# Patient Record
Sex: FEMALE | Race: WHITE | Employment: OTHER | URBAN - METROPOLITAN AREA
[De-identification: names, ages, dates, MRNs, and addresses within clinical notes are randomized per-mention and may not be internally consistent; named-entity substitution may affect disease eponyms.]

---

## 2018-06-26 ENCOUNTER — ANESTHESIA EVENT (OUTPATIENT)
Dept: OPERATING ROOM | Age: 63
End: 2018-06-26
Payer: MEDICARE

## 2018-06-27 ENCOUNTER — HOSPITAL ENCOUNTER (OUTPATIENT)
Age: 63
Setting detail: OUTPATIENT SURGERY
Discharge: HOME | End: 2018-06-27
Attending: INTERNAL MEDICINE | Admitting: INTERNAL MEDICINE
Payer: MEDICARE

## 2018-06-27 ENCOUNTER — ANESTHESIA (OUTPATIENT)
Dept: OPERATING ROOM | Age: 63
End: 2018-06-27
Payer: MEDICARE

## 2018-06-27 ENCOUNTER — APPOINTMENT (OUTPATIENT)
Dept: GENERAL RADIOLOGY | Age: 63
End: 2018-06-27
Attending: INTERNAL MEDICINE
Payer: MEDICARE

## 2018-06-27 VITALS
SYSTOLIC BLOOD PRESSURE: 136 MMHG | TEMPERATURE: 97.2 F | BODY MASS INDEX: 27 KG/M2 | RESPIRATION RATE: 16 BRPM | HEART RATE: 60 BPM | HEIGHT: 66 IN | WEIGHT: 168 LBS | DIASTOLIC BLOOD PRESSURE: 95 MMHG | OXYGEN SATURATION: 97 %

## 2018-06-27 VITALS — OXYGEN SATURATION: 100 % | DIASTOLIC BLOOD PRESSURE: 95 MMHG | TEMPERATURE: 96.1 F | SYSTOLIC BLOOD PRESSURE: 136 MMHG

## 2018-06-27 LAB
ALBUMIN SERPL-MCNC: 4.1 G/DL (ref 3.5–5.2)
ALBUMIN/GLOBULIN RATIO: 1.6 (ref 1–2.5)
ALP BLD-CCNC: 45 U/L (ref 35–104)
ALT SERPL-CCNC: 9 U/L (ref 5–33)
AST SERPL-CCNC: 15 U/L
BILIRUB SERPL-MCNC: 0.36 MG/DL (ref 0.3–1.2)
BILIRUBIN DIRECT: 0.09 MG/DL
BILIRUBIN, INDIRECT: 0.27 MG/DL (ref 0–1)
GLOBULIN: NORMAL G/DL (ref 1.5–3.8)
TOTAL PROTEIN: 6.6 G/DL (ref 6.4–8.3)

## 2018-06-27 PROCEDURE — 3609012400 HC EGD TRANSORAL BIOPSY SINGLE/MULTIPLE: Performed by: INTERNAL MEDICINE

## 2018-06-27 PROCEDURE — 2580000003 HC RX 258: Performed by: NURSE ANESTHETIST, CERTIFIED REGISTERED

## 2018-06-27 PROCEDURE — 74328 X-RAY BILE DUCT ENDOSCOPY: CPT

## 2018-06-27 PROCEDURE — 2580000003 HC RX 258: Performed by: ANESTHESIOLOGY

## 2018-06-27 PROCEDURE — 2720000010 HC SURG SUPPLY STERILE: Performed by: INTERNAL MEDICINE

## 2018-06-27 PROCEDURE — 6370000000 HC RX 637 (ALT 250 FOR IP): Performed by: ANESTHESIOLOGY

## 2018-06-27 PROCEDURE — 7100000001 HC PACU RECOVERY - ADDTL 15 MIN: Performed by: INTERNAL MEDICINE

## 2018-06-27 PROCEDURE — 3609018800 HC ERCP DX COLLECTION SPECIMEN BRUSHING/WASHING: Performed by: INTERNAL MEDICINE

## 2018-06-27 PROCEDURE — 7100000010 HC PHASE II RECOVERY - FIRST 15 MIN: Performed by: INTERNAL MEDICINE

## 2018-06-27 PROCEDURE — 7100000000 HC PACU RECOVERY - FIRST 15 MIN: Performed by: INTERNAL MEDICINE

## 2018-06-27 PROCEDURE — 2500000003 HC RX 250 WO HCPCS: Performed by: NURSE ANESTHETIST, CERTIFIED REGISTERED

## 2018-06-27 PROCEDURE — 88305 TISSUE EXAM BY PATHOLOGIST: CPT

## 2018-06-27 PROCEDURE — 3700000000 HC ANESTHESIA ATTENDED CARE: Performed by: INTERNAL MEDICINE

## 2018-06-27 PROCEDURE — 6360000002 HC RX W HCPCS: Performed by: ANESTHESIOLOGY

## 2018-06-27 PROCEDURE — C1726 CATH, BAL DIL, NON-VASCULAR: HCPCS | Performed by: INTERNAL MEDICINE

## 2018-06-27 PROCEDURE — C1769 GUIDE WIRE: HCPCS | Performed by: INTERNAL MEDICINE

## 2018-06-27 PROCEDURE — 3700000001 HC ADD 15 MINUTES (ANESTHESIA): Performed by: INTERNAL MEDICINE

## 2018-06-27 PROCEDURE — C1725 CATH, TRANSLUMIN NON-LASER: HCPCS | Performed by: INTERNAL MEDICINE

## 2018-06-27 PROCEDURE — 6360000002 HC RX W HCPCS: Performed by: NURSE ANESTHETIST, CERTIFIED REGISTERED

## 2018-06-27 PROCEDURE — 80076 HEPATIC FUNCTION PANEL: CPT

## 2018-06-27 PROCEDURE — 3609018700 HC ENDOSCOPIC ULTRASOUND: Performed by: INTERNAL MEDICINE

## 2018-06-27 RX ORDER — SODIUM CHLORIDE, SODIUM LACTATE, POTASSIUM CHLORIDE, CALCIUM CHLORIDE 600; 310; 30; 20 MG/100ML; MG/100ML; MG/100ML; MG/100ML
INJECTION, SOLUTION INTRAVENOUS CONTINUOUS PRN
Status: DISCONTINUED | OUTPATIENT
Start: 2018-06-27 | End: 2018-06-27 | Stop reason: SDUPTHER

## 2018-06-27 RX ORDER — FENTANYL CITRATE 50 UG/ML
INJECTION, SOLUTION INTRAMUSCULAR; INTRAVENOUS PRN
Status: DISCONTINUED | OUTPATIENT
Start: 2018-06-27 | End: 2018-06-27 | Stop reason: SDUPTHER

## 2018-06-27 RX ORDER — LIDOCAINE HYDROCHLORIDE 10 MG/ML
INJECTION, SOLUTION EPIDURAL; INFILTRATION; INTRACAUDAL; PERINEURAL PRN
Status: DISCONTINUED | OUTPATIENT
Start: 2018-06-27 | End: 2018-06-27 | Stop reason: SDUPTHER

## 2018-06-27 RX ORDER — SODIUM CHLORIDE, SODIUM LACTATE, POTASSIUM CHLORIDE, CALCIUM CHLORIDE 600; 310; 30; 20 MG/100ML; MG/100ML; MG/100ML; MG/100ML
INJECTION, SOLUTION INTRAVENOUS CONTINUOUS
Status: DISCONTINUED | OUTPATIENT
Start: 2018-06-27 | End: 2018-06-27 | Stop reason: HOSPADM

## 2018-06-27 RX ORDER — MIDAZOLAM HYDROCHLORIDE 1 MG/ML
1 INJECTION INTRAMUSCULAR; INTRAVENOUS EVERY 5 MIN PRN
Status: COMPLETED | OUTPATIENT
Start: 2018-06-27 | End: 2018-06-27

## 2018-06-27 RX ORDER — FENTANYL CITRATE 50 UG/ML
25 INJECTION, SOLUTION INTRAMUSCULAR; INTRAVENOUS EVERY 5 MIN PRN
Status: DISCONTINUED | OUTPATIENT
Start: 2018-06-27 | End: 2018-06-27 | Stop reason: HOSPADM

## 2018-06-27 RX ORDER — CEFAZOLIN SODIUM 1 G/3ML
INJECTION, POWDER, FOR SOLUTION INTRAMUSCULAR; INTRAVENOUS PRN
Status: DISCONTINUED | OUTPATIENT
Start: 2018-06-27 | End: 2018-06-27 | Stop reason: SDUPTHER

## 2018-06-27 RX ORDER — DIPHENHYDRAMINE HYDROCHLORIDE 50 MG/ML
12.5 INJECTION INTRAMUSCULAR; INTRAVENOUS
Status: DISCONTINUED | OUTPATIENT
Start: 2018-06-27 | End: 2018-06-27 | Stop reason: HOSPADM

## 2018-06-27 RX ORDER — SCOLOPAMINE TRANSDERMAL SYSTEM 1 MG/1
1 PATCH, EXTENDED RELEASE TRANSDERMAL ONCE
Status: DISCONTINUED | OUTPATIENT
Start: 2018-06-27 | End: 2018-06-27 | Stop reason: HOSPADM

## 2018-06-27 RX ORDER — PROPOFOL 10 MG/ML
INJECTION, EMULSION INTRAVENOUS PRN
Status: DISCONTINUED | OUTPATIENT
Start: 2018-06-27 | End: 2018-06-27 | Stop reason: SDUPTHER

## 2018-06-27 RX ORDER — ROCURONIUM BROMIDE 10 MG/ML
INJECTION, SOLUTION INTRAVENOUS PRN
Status: DISCONTINUED | OUTPATIENT
Start: 2018-06-27 | End: 2018-06-27 | Stop reason: SDUPTHER

## 2018-06-27 RX ORDER — GLYCOPYRROLATE 1 MG/5 ML
SYRINGE (ML) INTRAVENOUS PRN
Status: DISCONTINUED | OUTPATIENT
Start: 2018-06-27 | End: 2018-06-27 | Stop reason: SDUPTHER

## 2018-06-27 RX ORDER — ONDANSETRON 2 MG/ML
4 INJECTION INTRAMUSCULAR; INTRAVENOUS
Status: DISCONTINUED | OUTPATIENT
Start: 2018-06-27 | End: 2018-06-27 | Stop reason: HOSPADM

## 2018-06-27 RX ORDER — FENTANYL CITRATE 50 UG/ML
50 INJECTION, SOLUTION INTRAMUSCULAR; INTRAVENOUS EVERY 5 MIN PRN
Status: DISCONTINUED | OUTPATIENT
Start: 2018-06-27 | End: 2018-06-27 | Stop reason: HOSPADM

## 2018-06-27 RX ADMIN — PROPOFOL 200 MG: 10 INJECTION, EMULSION INTRAVENOUS at 13:29

## 2018-06-27 RX ADMIN — CEFAZOLIN 2000 MG: 1 INJECTION, POWDER, FOR SOLUTION INTRAMUSCULAR; INTRAVENOUS at 14:10

## 2018-06-27 RX ADMIN — SODIUM CHLORIDE, POTASSIUM CHLORIDE, SODIUM LACTATE AND CALCIUM CHLORIDE: 600; 310; 30; 20 INJECTION, SOLUTION INTRAVENOUS at 15:00

## 2018-06-27 RX ADMIN — LIDOCAINE HYDROCHLORIDE 20 MG: 10 INJECTION, SOLUTION EPIDURAL; INFILTRATION; INTRACAUDAL; PERINEURAL at 14:18

## 2018-06-27 RX ADMIN — HYDROCORTISONE SODIUM SUCCINATE 100 MG: 100 INJECTION, POWDER, FOR SOLUTION INTRAMUSCULAR; INTRAVENOUS at 13:27

## 2018-06-27 RX ADMIN — NEOSTIGMINE METHYLSULFATE 3 MG: 1 INJECTION, SOLUTION INTRAMUSCULAR; INTRAVENOUS; SUBCUTANEOUS at 15:15

## 2018-06-27 RX ADMIN — ROCURONIUM BROMIDE 50 MG: 10 INJECTION INTRAVENOUS at 13:29

## 2018-06-27 RX ADMIN — PHENYLEPHRINE HYDROCHLORIDE 100 MCG: 10 INJECTION INTRAVENOUS at 14:45

## 2018-06-27 RX ADMIN — FENTANYL CITRATE 50 MCG: 50 INJECTION INTRAMUSCULAR; INTRAVENOUS at 14:55

## 2018-06-27 RX ADMIN — PHENYLEPHRINE HYDROCHLORIDE 100 MCG: 10 INJECTION INTRAVENOUS at 14:05

## 2018-06-27 RX ADMIN — Medication 0.4 MG: at 15:15

## 2018-06-27 RX ADMIN — MIDAZOLAM HYDROCHLORIDE 1 MG: 1 INJECTION, SOLUTION INTRAMUSCULAR; INTRAVENOUS at 13:01

## 2018-06-27 RX ADMIN — SODIUM CHLORIDE, POTASSIUM CHLORIDE, SODIUM LACTATE AND CALCIUM CHLORIDE: 600; 310; 30; 20 INJECTION, SOLUTION INTRAVENOUS at 13:24

## 2018-06-27 RX ADMIN — LIDOCAINE HYDROCHLORIDE 50 MG: 10 INJECTION, SOLUTION EPIDURAL; INFILTRATION; INTRACAUDAL; PERINEURAL at 13:29

## 2018-06-27 RX ADMIN — Medication 0.2 MG: at 14:05

## 2018-06-27 RX ADMIN — SODIUM CHLORIDE, POTASSIUM CHLORIDE, SODIUM LACTATE AND CALCIUM CHLORIDE: 600; 310; 30; 20 INJECTION, SOLUTION INTRAVENOUS at 12:22

## 2018-06-27 RX ADMIN — MIDAZOLAM HYDROCHLORIDE 1 MG: 1 INJECTION, SOLUTION INTRAMUSCULAR; INTRAVENOUS at 13:02

## 2018-06-27 RX ADMIN — FENTANYL CITRATE 50 MCG: 50 INJECTION INTRAMUSCULAR; INTRAVENOUS at 13:29

## 2018-06-27 RX ADMIN — PHENYLEPHRINE HYDROCHLORIDE 100 MCG: 10 INJECTION INTRAVENOUS at 14:26

## 2018-06-27 ASSESSMENT — PULMONARY FUNCTION TESTS
PIF_VALUE: 26
PIF_VALUE: 3
PIF_VALUE: 26
PIF_VALUE: 28
PIF_VALUE: 18
PIF_VALUE: 30
PIF_VALUE: 29
PIF_VALUE: 2
PIF_VALUE: 18
PIF_VALUE: 17
PIF_VALUE: 18
PIF_VALUE: 19
PIF_VALUE: 32
PIF_VALUE: 19
PIF_VALUE: 18
PIF_VALUE: 19
PIF_VALUE: 8
PIF_VALUE: 32
PIF_VALUE: 18
PIF_VALUE: 27
PIF_VALUE: 19
PIF_VALUE: 29
PIF_VALUE: 18
PIF_VALUE: 20
PIF_VALUE: 1
PIF_VALUE: 19
PIF_VALUE: 26
PIF_VALUE: 19
PIF_VALUE: 29
PIF_VALUE: 19
PIF_VALUE: 18
PIF_VALUE: 18
PIF_VALUE: 32
PIF_VALUE: 18
PIF_VALUE: 32
PIF_VALUE: 0
PIF_VALUE: 0
PIF_VALUE: 23
PIF_VALUE: 19
PIF_VALUE: 31
PIF_VALUE: 29
PIF_VALUE: 23
PIF_VALUE: 3
PIF_VALUE: 28
PIF_VALUE: 2
PIF_VALUE: 19
PIF_VALUE: 32
PIF_VALUE: 19
PIF_VALUE: 18
PIF_VALUE: 27
PIF_VALUE: 29
PIF_VALUE: 27
PIF_VALUE: 16
PIF_VALUE: 11
PIF_VALUE: 32
PIF_VALUE: 18
PIF_VALUE: 22
PIF_VALUE: 28
PIF_VALUE: 31
PIF_VALUE: 28
PIF_VALUE: 19
PIF_VALUE: 2
PIF_VALUE: 6
PIF_VALUE: 3
PIF_VALUE: 31
PIF_VALUE: 29
PIF_VALUE: 13
PIF_VALUE: 29
PIF_VALUE: 18
PIF_VALUE: 19
PIF_VALUE: 32
PIF_VALUE: 21
PIF_VALUE: 18
PIF_VALUE: 26
PIF_VALUE: 27
PIF_VALUE: 30
PIF_VALUE: 28
PIF_VALUE: 16
PIF_VALUE: 23
PIF_VALUE: 19
PIF_VALUE: 23
PIF_VALUE: 34
PIF_VALUE: 19
PIF_VALUE: 30
PIF_VALUE: 28
PIF_VALUE: 20
PIF_VALUE: 18
PIF_VALUE: 19
PIF_VALUE: 28
PIF_VALUE: 19
PIF_VALUE: 23
PIF_VALUE: 17
PIF_VALUE: 21
PIF_VALUE: 18
PIF_VALUE: 28
PIF_VALUE: 1
PIF_VALUE: 28
PIF_VALUE: 18
PIF_VALUE: 18
PIF_VALUE: 22
PIF_VALUE: 19
PIF_VALUE: 18
PIF_VALUE: 35
PIF_VALUE: 19
PIF_VALUE: 19
PIF_VALUE: 18
PIF_VALUE: 25
PIF_VALUE: 17
PIF_VALUE: 17
PIF_VALUE: 19
PIF_VALUE: 18
PIF_VALUE: 30
PIF_VALUE: 0
PIF_VALUE: 22
PIF_VALUE: 19
PIF_VALUE: 28
PIF_VALUE: 27
PIF_VALUE: 2
PIF_VALUE: 18
PIF_VALUE: 18
PIF_VALUE: 27
PIF_VALUE: 27

## 2018-06-27 ASSESSMENT — ENCOUNTER SYMPTOMS
SHORTNESS OF BREATH: 0
STRIDOR: 0

## 2018-06-27 ASSESSMENT — PAIN SCALES - GENERAL
PAINLEVEL_OUTOF10: 0

## 2018-06-27 ASSESSMENT — PAIN - FUNCTIONAL ASSESSMENT: PAIN_FUNCTIONAL_ASSESSMENT: 0-10

## 2018-06-28 LAB — SURGICAL PATHOLOGY REPORT: NORMAL

## 2019-08-14 ENCOUNTER — HOSPITAL ENCOUNTER (OUTPATIENT)
Age: 64
Setting detail: SPECIMEN
Discharge: HOME | End: 2019-08-14
Payer: COMMERCIAL

## 2019-08-23 LAB
SEND OUT REPORT: NORMAL
TEST NAME: NORMAL

## 2023-09-11 RX ORDER — TRAZODONE HYDROCHLORIDE 50 MG/1
TABLET ORAL
Qty: 30 TABLET | Refills: 5 | Status: SHIPPED | OUTPATIENT
Start: 2023-09-11

## 2023-09-11 NOTE — TELEPHONE ENCOUNTER
Nahum Wynne is calling to request a refill on the following medication(s):    Medication Request:  Requested Prescriptions     Pending Prescriptions Disp Refills    traZODone (DESYREL) 50 MG tablet [Pharmacy Med Name: traZODone 50 MG TABLET] 30 tablet      Sig: TAKE ONE TABLET BY MOUTH EVERY NIGHT AT BEDTIME AS NEEDED       Last Visit Date (If Applicable):  Visit date not found    Next Visit Date:    9/12/2023

## 2023-09-12 ENCOUNTER — OFFICE VISIT (OUTPATIENT)
Age: 68
End: 2023-09-12
Payer: MEDICARE

## 2023-09-12 VITALS
RESPIRATION RATE: 16 BRPM | SYSTOLIC BLOOD PRESSURE: 130 MMHG | BODY MASS INDEX: 22.95 KG/M2 | TEMPERATURE: 97.2 F | HEART RATE: 66 BPM | DIASTOLIC BLOOD PRESSURE: 86 MMHG | WEIGHT: 142.2 LBS

## 2023-09-12 DIAGNOSIS — G40.109 EPILEPSY, PARTIAL (HCC): ICD-10-CM

## 2023-09-12 DIAGNOSIS — M19.042 PRIMARY OSTEOARTHRITIS OF BOTH HANDS: Primary | ICD-10-CM

## 2023-09-12 DIAGNOSIS — Z91.81 AT HIGH RISK FOR FALLS: ICD-10-CM

## 2023-09-12 DIAGNOSIS — M19.041 PRIMARY OSTEOARTHRITIS OF BOTH HANDS: Primary | ICD-10-CM

## 2023-09-12 PROCEDURE — 3017F COLORECTAL CA SCREEN DOC REV: CPT | Performed by: FAMILY MEDICINE

## 2023-09-12 PROCEDURE — G8400 PT W/DXA NO RESULTS DOC: HCPCS | Performed by: FAMILY MEDICINE

## 2023-09-12 PROCEDURE — G8427 DOCREV CUR MEDS BY ELIG CLIN: HCPCS | Performed by: FAMILY MEDICINE

## 2023-09-12 PROCEDURE — 1036F TOBACCO NON-USER: CPT | Performed by: FAMILY MEDICINE

## 2023-09-12 PROCEDURE — 1123F ACP DISCUSS/DSCN MKR DOCD: CPT | Performed by: FAMILY MEDICINE

## 2023-09-12 PROCEDURE — G8420 CALC BMI NORM PARAMETERS: HCPCS | Performed by: FAMILY MEDICINE

## 2023-09-12 PROCEDURE — 1090F PRES/ABSN URINE INCON ASSESS: CPT | Performed by: FAMILY MEDICINE

## 2023-09-12 PROCEDURE — 99213 OFFICE O/P EST LOW 20 MIN: CPT | Performed by: FAMILY MEDICINE

## 2023-09-12 RX ORDER — MULTIVITAMIN WITH IRON
250 TABLET ORAL DAILY
COMMUNITY

## 2023-09-12 RX ORDER — QUETIAPINE FUMARATE 25 MG/1
25 TABLET, FILM COATED ORAL NIGHTLY
COMMUNITY

## 2023-09-12 RX ORDER — DOXYCYCLINE HYCLATE 100 MG
100 TABLET ORAL 2 TIMES DAILY
COMMUNITY

## 2023-09-12 RX ORDER — CHOLESTYRAMINE 4 G/9G
1 POWDER, FOR SUSPENSION ORAL
COMMUNITY

## 2023-09-12 RX ORDER — BUPROPION HYDROCHLORIDE 150 MG/1
150 TABLET ORAL EVERY MORNING
COMMUNITY

## 2023-09-12 RX ORDER — DULOXETIN HYDROCHLORIDE 30 MG/1
30 CAPSULE, DELAYED RELEASE ORAL DAILY
COMMUNITY

## 2023-09-12 RX ORDER — PREDNISONE 20 MG/1
20 TABLET ORAL 2 TIMES DAILY
Qty: 10 TABLET | Refills: 0 | Status: SHIPPED | OUTPATIENT
Start: 2023-09-12 | End: 2023-09-15 | Stop reason: SDUPTHER

## 2023-09-12 RX ORDER — CYCLOBENZAPRINE HCL 10 MG
10 TABLET ORAL 3 TIMES DAILY PRN
COMMUNITY

## 2023-09-12 RX ORDER — TEMAZEPAM 30 MG/1
1 CAPSULE ORAL NIGHTLY
COMMUNITY
Start: 2023-08-23

## 2023-09-12 RX ORDER — OXYCODONE HYDROCHLORIDE AND ACETAMINOPHEN 5; 325 MG/1; MG/1
1 TABLET ORAL EVERY 4 HOURS PRN
COMMUNITY

## 2023-09-12 RX ORDER — CEPHALEXIN 500 MG/1
500 CAPSULE ORAL 3 TIMES DAILY
COMMUNITY

## 2023-09-12 RX ORDER — PANTOPRAZOLE SODIUM 40 MG/1
1 TABLET, DELAYED RELEASE ORAL DAILY
COMMUNITY
Start: 2023-07-13

## 2023-09-12 RX ORDER — HYDROXYZINE HYDROCHLORIDE 25 MG/1
25 TABLET, FILM COATED ORAL 3 TIMES DAILY PRN
COMMUNITY

## 2023-09-12 RX ORDER — ALPRAZOLAM 0.5 MG/1
0.5 TABLET ORAL NIGHTLY PRN
COMMUNITY

## 2023-09-12 RX ORDER — FUROSEMIDE 20 MG/1
20 TABLET ORAL 2 TIMES DAILY PRN
COMMUNITY

## 2023-09-12 RX ORDER — IBUPROFEN 800 MG/1
800 TABLET ORAL EVERY 8 HOURS PRN
COMMUNITY

## 2023-09-12 RX ORDER — ATORVASTATIN CALCIUM 10 MG/1
10 TABLET, FILM COATED ORAL DAILY
COMMUNITY

## 2023-09-12 RX ORDER — DICYCLOMINE HYDROCHLORIDE 10 MG/1
10 CAPSULE ORAL
COMMUNITY

## 2023-09-12 SDOH — ECONOMIC STABILITY: FOOD INSECURITY: WITHIN THE PAST 12 MONTHS, YOU WORRIED THAT YOUR FOOD WOULD RUN OUT BEFORE YOU GOT MONEY TO BUY MORE.: NEVER TRUE

## 2023-09-12 SDOH — ECONOMIC STABILITY: HOUSING INSECURITY
IN THE LAST 12 MONTHS, WAS THERE A TIME WHEN YOU DID NOT HAVE A STEADY PLACE TO SLEEP OR SLEPT IN A SHELTER (INCLUDING NOW)?: NO

## 2023-09-12 SDOH — ECONOMIC STABILITY: INCOME INSECURITY: HOW HARD IS IT FOR YOU TO PAY FOR THE VERY BASICS LIKE FOOD, HOUSING, MEDICAL CARE, AND HEATING?: NOT HARD AT ALL

## 2023-09-12 SDOH — ECONOMIC STABILITY: FOOD INSECURITY: WITHIN THE PAST 12 MONTHS, THE FOOD YOU BOUGHT JUST DIDN'T LAST AND YOU DIDN'T HAVE MONEY TO GET MORE.: NEVER TRUE

## 2023-09-12 ASSESSMENT — PATIENT HEALTH QUESTIONNAIRE - PHQ9
4. FEELING TIRED OR HAVING LITTLE ENERGY: 2
8. MOVING OR SPEAKING SO SLOWLY THAT OTHER PEOPLE COULD HAVE NOTICED. OR THE OPPOSITE, BEING SO FIGETY OR RESTLESS THAT YOU HAVE BEEN MOVING AROUND A LOT MORE THAN USUAL: 1
SUM OF ALL RESPONSES TO PHQ9 QUESTIONS 1 & 2: 3
6. FEELING BAD ABOUT YOURSELF - OR THAT YOU ARE A FAILURE OR HAVE LET YOURSELF OR YOUR FAMILY DOWN: 3
SUM OF ALL RESPONSES TO PHQ QUESTIONS 1-9: 15
10. IF YOU CHECKED OFF ANY PROBLEMS, HOW DIFFICULT HAVE THESE PROBLEMS MADE IT FOR YOU TO DO YOUR WORK, TAKE CARE OF THINGS AT HOME, OR GET ALONG WITH OTHER PEOPLE: 2
7. TROUBLE CONCENTRATING ON THINGS, SUCH AS READING THE NEWSPAPER OR WATCHING TELEVISION: 2
9. THOUGHTS THAT YOU WOULD BE BETTER OFF DEAD, OR OF HURTING YOURSELF: 0
2. FEELING DOWN, DEPRESSED OR HOPELESS: 3
5. POOR APPETITE OR OVEREATING: 2
1. LITTLE INTEREST OR PLEASURE IN DOING THINGS: 0
3. TROUBLE FALLING OR STAYING ASLEEP: 2

## 2023-09-12 NOTE — PROGRESS NOTES
Take by mouth      buPROPion (WELLBUTRIN XL) 150 MG extended release tablet Take 1 tablet by mouth every morning      traZODone (DESYREL) 50 MG tablet TAKE ONE TABLET BY MOUTH EVERY NIGHT AT BEDTIME AS NEEDED 30 tablet 5    NONFORMULARY Take 60 mg by mouth daily (Patient not taking: Reported on 9/12/2023)      divalproex (DEPAKOTE) 250 MG DR tablet TAKE 1 TABLET FOUR TIMES A  tablet 3    MAXALT 10 MG tablet 1 at onset of headache. May repeat in 2 h x 2 if headache not gone. Must be RED 18 tablet 3    rizatriptan (MAXALT-MLT) 10 MG disintegrating tablet PLACE 1 TABLET ON TONGUE AT ONSET OF HEADACHE. MAY REPEAT IN 2 HOURS IF HEADACHE NOT RELIEVED 18 tablet 1    butalbital-acetaminophen-caffeine (FIORICET, ESGIC) per tablet TAKE ONE TABLET BY MOUTH EVERY 6 HOURS AS NEEDED FOR PAIN 60 tablet 0    simvastatin (ZOCOR) 10 MG tablet Take 10 mg by mouth nightly. (Patient not taking: Reported on 9/12/2023)      citalopram (CELEXA) 40 MG tablet Take 40 mg by mouth daily. (Patient not taking: Reported on 9/12/2023)       No current facility-administered medications for this visit. Patient Active Problem List   Diagnosis    Epilepsy, partial ([address])    Ataxic gait    Migraine without aura       Past Medical History:   Diagnosis Date    Ataxic gait     Cervical disc displacement     MRI cervical spine 5/2009 with right paracentral protrusion and osteophyte spur at C6-7. Operated by Dr. Kenneth Pedersen 6/2009. Crohn's disease ([address])     Epilepsy, partial ([address])     Gastroesophageal reflux disease     Kidney stone     Melanoma ([address])     Memory disorder     Neuropsychological testing never done.     Migraine     Mitral valve prolapse     Osteoarthritis     Ulcerative colitis [facility])        Past Surgical History:   Procedure Laterality Date    CERVICAL SPINE SURGERY  2009    C6-7 fusion    [facility] ENDOSCOPIC US S&I N/A 6/27/2018    ENDOSCOPIC ULTRASOUND - GI UNIT SCHEDLED, PATHOLOGY NOTIFIED (PAT N) performed by Riki RASHID

## 2023-09-13 ENCOUNTER — TELEPHONE (OUTPATIENT)
Age: 68
End: 2023-09-13

## 2023-09-13 ASSESSMENT — ENCOUNTER SYMPTOMS
CHEST TIGHTNESS: 0
SHORTNESS OF BREATH: 0

## 2023-09-13 NOTE — TELEPHONE ENCOUNTER
Poppy Chandler () states patient was here yesterday and her Voltaren and Prednisone got sent to the wrong pharmacy. Please send to [facility].  No need to advise when done unless there is a problem

## 2023-09-14 ENCOUNTER — TELEPHONE (OUTPATIENT)
Age: 68
End: 2023-09-14

## 2023-09-14 NOTE — TELEPHONE ENCOUNTER
Patient  stopped by office to update patients pharmacy, they now use [facility].  Needs changed and also would like RX's sent on 9/12 the diclofencac and Prednisone sent to [facility] instead

## 2023-09-15 ENCOUNTER — TELEPHONE (OUTPATIENT)
Age: 68
End: 2023-09-15

## 2023-09-15 RX ORDER — PREDNISONE 20 MG/1
20 TABLET ORAL 2 TIMES DAILY
Qty: 10 TABLET | Refills: 0 | Status: SHIPPED | OUTPATIENT
Start: 2023-09-15 | End: 2023-09-20 | Stop reason: SDUPTHER

## 2023-09-15 NOTE — TELEPHONE ENCOUNTER
We sent prednisone and voltaren cream rx to wrong pharmacy.  Please send to [facility] and let pt know

## 2023-09-20 RX ORDER — PREDNISONE 20 MG/1
20 TABLET ORAL 2 TIMES DAILY
Qty: 10 TABLET | Refills: 0 | Status: SHIPPED | OUTPATIENT
Start: 2023-09-20 | End: 2023-09-25

## 2023-09-20 NOTE — TELEPHONE ENCOUNTER
Nevin Pena is calling to request a refill on the following medication(s):    Medication Request:  Requested Prescriptions     Pending Prescriptions Disp Refills    predniSONE (DELTASONE) 20 MG tablet 10 tablet 0     Sig: Take 1 tablet by mouth 2 times daily for 5 days    diclofenac sodium (VOLTAREN) 1 %  g 2     Sig: Apply 2 g topically 4 times daily       Last Visit Date (If Applicable):  5/39/4852    Next Visit Date:    10/10/2023

## 2023-09-26 ENCOUNTER — TELEPHONE (OUTPATIENT)
Age: 68
End: 2023-09-26

## 2023-09-26 NOTE — TELEPHONE ENCOUNTER
Handicap koko made, signed by Dr. Estrella Emery. Patient advised her and  Roxy Bedolla notes ready at desk.

## 2023-10-06 RX ORDER — DIPHENOXYLATE HYDROCHLORIDE AND ATROPINE SULFATE 2.5; .025 MG/1; MG/1
1 TABLET ORAL 4 TIMES DAILY PRN
COMMUNITY

## 2023-10-06 RX ORDER — DIVALPROEX SODIUM 250 MG/1
250 TABLET, DELAYED RELEASE ORAL 4 TIMES DAILY
COMMUNITY

## 2023-10-09 DIAGNOSIS — I34.1 MITRAL VALVE PROLAPSE: ICD-10-CM

## 2023-10-09 DIAGNOSIS — N20.0 KIDNEY STONE: Primary | ICD-10-CM

## 2023-10-09 PROBLEM — K25.9 STOMACH ULCER: Status: ACTIVE | Noted: 2023-10-09

## 2023-10-09 PROBLEM — F32.9 MAJOR DEPRESSION, SINGLE EPISODE: Status: ACTIVE | Noted: 2019-01-11

## 2023-10-09 PROBLEM — R22.30 MASS OF SHOULDER REGION: Status: ACTIVE | Noted: 2019-01-11

## 2023-10-09 PROBLEM — S20.219A CONTUSION OF RIB: Status: ACTIVE | Noted: 2019-01-11

## 2023-10-09 PROBLEM — G20.C PRIMARY PARKINSONISM: Status: ACTIVE | Noted: 2019-01-11

## 2023-10-09 PROBLEM — I73.00 RAYNAUD'S DISEASE: Status: ACTIVE | Noted: 2023-10-09

## 2023-10-09 PROBLEM — R53.83 FATIGUE: Status: ACTIVE | Noted: 2019-01-11

## 2023-10-09 PROBLEM — R60.9 PERIPHERAL EDEMA: Status: ACTIVE | Noted: 2019-01-11

## 2023-10-09 PROBLEM — M71.9 BURSITIS: Status: ACTIVE | Noted: 2019-01-11

## 2023-10-09 PROBLEM — H04.129 TEAR FILM INSUFFICIENCY: Status: ACTIVE | Noted: 2019-01-11
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
## 2023-10-09 PROBLEM — H93.19 TINNITUS: Status: ACTIVE | Noted: 2023-10-09

## 2023-10-09 PROBLEM — H40.9 GLAUCOMA: Status: ACTIVE | Noted: 2019-01-11

## 2023-10-09 PROBLEM — R20.9 SKIN SENSATION DISTURBANCE: Status: ACTIVE | Noted: 2019-01-11

## 2023-10-09 PROBLEM — N39.0 URINARY TRACT INFECTION: Status: ACTIVE | Noted: 2023-10-09

## 2023-10-09 PROBLEM — R29.6 FREQUENT FALLS: Status: ACTIVE | Noted: 2019-01-11

## 2023-10-09 PROBLEM — R31.29 MICROSCOPIC HEMATURIA: Status: ACTIVE | Noted: 2019-01-11

## 2023-10-09 PROBLEM — R27.0 ATAXIA: Status: ACTIVE | Noted: 2019-01-11

## 2023-10-09 PROBLEM — M25.50 PAIN IN JOINT: Status: ACTIVE | Noted: 2019-01-11

## 2023-10-09 PROBLEM — R41.3 MEMORY LOSS: Status: ACTIVE | Noted: 2023-04-19

## 2023-10-09 PROBLEM — K52.9 COLITIS: Status: ACTIVE | Noted: 2023-10-09

## 2023-10-09 PROBLEM — R79.9 ABNORMAL BLOOD CHEMISTRY LEVEL: Status: ACTIVE | Noted: 2019-01-11

## 2023-10-09 PROBLEM — F32.A DEPRESSION: Status: ACTIVE | Noted: 2019-01-11

## 2023-10-09 PROBLEM — M85.80 OSTEOPENIA: Status: ACTIVE | Noted: 2019-01-11

## 2023-10-09 PROBLEM — F52.9: Status: ACTIVE | Noted: 2019-01-11

## 2023-10-09 PROBLEM — H04.123 DRY EYES: Status: ACTIVE | Noted: 2019-01-11

## 2023-10-09 PROBLEM — R19.7 DIARRHEA: Status: ACTIVE | Noted: 2019-01-11

## 2023-10-09 PROBLEM — M70.50 BURSITIS OF KNEE: Status: ACTIVE | Noted: 2019-01-11

## 2023-10-09 PROBLEM — K50.90 REGIONAL ENTERITIS (HCC): Status: ACTIVE | Noted: 2023-10-09

## 2023-10-09 PROBLEM — B35.1 TINEA UNGUIUM: Status: ACTIVE | Noted: 2019-01-11

## 2023-10-09 PROBLEM — M16.9 OSTEOARTHRITIS OF HIP: Status: ACTIVE | Noted: 2019-01-11

## 2023-10-09 PROBLEM — N63.0 BREAST LUMP: Status: ACTIVE | Noted: 2019-01-11

## 2023-10-09 PROBLEM — N32.81 OVERACTIVE BLADDER: Status: ACTIVE | Noted: 2019-01-11

## 2023-10-09 PROBLEM — M51.06 LUMBAR DISC DISORDER WITH MYELOPATHY: Status: ACTIVE | Noted: 2019-01-11

## 2023-10-09 PROBLEM — R94.31 ELECTROCARDIOGRAM ABNORMAL: Status: ACTIVE | Noted: 2019-01-11

## 2023-10-09 PROBLEM — B02.9 HERPES ZOSTER: Status: ACTIVE | Noted: 2023-10-09

## 2023-10-09 PROBLEM — M48.02 SPINAL STENOSIS OF CERVICAL REGION: Status: ACTIVE | Noted: 2019-01-11

## 2023-10-09 PROBLEM — I10 HYPERTENSION: Status: ACTIVE | Noted: 2019-01-11

## 2023-10-09 PROBLEM — R79.89 ABNORMAL LIVER FUNCTION TESTS: Status: ACTIVE | Noted: 2019-01-11

## 2023-10-09 PROBLEM — M79.7 FIBROMYALGIA: Status: ACTIVE | Noted: 2023-10-09

## 2023-10-09 PROBLEM — R68.82 REDUCED LIBIDO: Status: ACTIVE | Noted: 2019-01-11

## 2023-10-09 PROBLEM — K13.70 LESION OF ORAL MUCOSA: Status: ACTIVE | Noted: 2019-01-11

## 2023-10-09 PROBLEM — G25.0 ESSENTIAL TREMOR: Status: ACTIVE | Noted: 2019-01-11

## 2023-10-09 PROBLEM — R41.81 AGE-RELATED COGNITIVE DECLINE: Status: ACTIVE | Noted: 2019-01-11

## 2023-10-09 PROBLEM — R30.0 DIFFICULT OR PAINFUL URINATION: Status: ACTIVE | Noted: 2019-01-11

## 2023-10-09 PROBLEM — K21.9 GASTROESOPHAGEAL REFLUX DISEASE: Status: ACTIVE | Noted: 2023-10-09

## 2023-10-09 PROBLEM — C43.9 MELANOMA (HCC): Status: ACTIVE | Noted: 2023-10-09

## 2023-10-09 PROBLEM — E78.5 HYPERLIPIDEMIA: Status: ACTIVE | Noted: 2019-01-11

## 2023-10-09 PROBLEM — G40.909 SEIZURE DISORDER (HCC): Status: ACTIVE | Noted: 2019-01-11

## 2023-10-09 PROBLEM — R25.1 TREMOR: Status: ACTIVE | Noted: 2019-01-11

## 2023-10-09 PROBLEM — G47.00 INSOMNIA: Status: ACTIVE | Noted: 2019-01-11

## 2023-10-09 PROBLEM — M51.26 HERNIATION OF LUMBAR INTERVERTEBRAL DISC WITHOUT MYELOPATHY: Status: ACTIVE | Noted: 2019-01-11

## 2023-10-09 PROBLEM — D12.6 BENIGN NEOPLASM OF COLON: Status: ACTIVE | Noted: 2019-01-11

## 2023-10-09 PROBLEM — R60.0 PERIPHERAL EDEMA: Status: ACTIVE | Noted: 2019-01-11

## 2023-10-09 PROBLEM — N95.2 ATROPHIC VAGINITIS: Status: ACTIVE | Noted: 2019-01-11

## 2023-10-10 RX ORDER — PANTOPRAZOLE SODIUM 40 MG/1
40 TABLET, DELAYED RELEASE ORAL DAILY
Qty: 90 TABLET | OUTPATIENT
Start: 2023-10-10

## 2023-10-10 RX ORDER — PANTOPRAZOLE SODIUM 40 MG/1
40 TABLET, DELAYED RELEASE ORAL DAILY
Qty: 90 TABLET | Refills: 0 | Status: SHIPPED | OUTPATIENT
Start: 2023-10-10

## 2023-10-10 NOTE — TELEPHONE ENCOUNTER
Tova Luo is calling to request a refill on the following medication(s):    Medication Request:  Requested Prescriptions     Pending Prescriptions Disp Refills    pantoprazole (PROTONIX) 40 MG tablet 90 tablet 0     Sig: Take 1 tablet by mouth daily       Last Visit Date (If Applicable):  5/67/4815    Next Visit Date:    10/10/2023

## 2023-10-11 RX ORDER — DIVALPROEX SODIUM 250 MG/1
250 TABLET, DELAYED RELEASE ORAL 4 TIMES DAILY
Qty: 360 TABLET | Refills: 2 | Status: SHIPPED | OUTPATIENT
Start: 2023-10-11

## 2023-10-11 NOTE — TELEPHONE ENCOUNTER
Yoel Degroot is calling to request a refill on the following medication(s):    Medication Request:  Requested Prescriptions     Pending Prescriptions Disp Refills    divalproex (DEPAKOTE) 250 MG DR tablet [Pharmacy Med Name: DIVALPROEX SOD  MG TAB] 360 tablet      Sig: TAKE ONE TABLET BY MOUTH FOUR TIMES A DAY       Last Visit Date (If Applicable):  0/01/0349    Next Visit Date:    Visit date not found

## 2023-10-20 DIAGNOSIS — F51.01 PRIMARY INSOMNIA: Primary | ICD-10-CM

## 2023-10-20 RX ORDER — TEMAZEPAM 30 MG/1
CAPSULE ORAL
Qty: 30 CAPSULE | Refills: 2 | Status: SHIPPED | OUTPATIENT
Start: 2023-10-20 | End: 2023-11-19

## 2023-10-23 ENCOUNTER — TELEPHONE (OUTPATIENT)
Age: 68
End: 2023-10-23

## 2023-10-23 DIAGNOSIS — F51.01 PRIMARY INSOMNIA: ICD-10-CM

## 2023-10-23 NOTE — TELEPHONE ENCOUNTER
Patient's  (anamaria) called to request an RX for Temazepam 30 mg 1 at bedtime be sent to [facility]

## 2023-10-24 RX ORDER — TEMAZEPAM 30 MG/1
30 CAPSULE ORAL NIGHTLY PRN
Qty: 30 CAPSULE | Refills: 2 | Status: SHIPPED | OUTPATIENT
Start: 2023-10-24 | End: 2024-01-22

## 2023-11-01 ENCOUNTER — TELEPHONE (OUTPATIENT)
Age: 68
End: 2023-11-01

## 2023-11-01 RX ORDER — CEPHALEXIN 500 MG/1
500 CAPSULE ORAL 2 TIMES DAILY
Qty: 14 CAPSULE | Refills: 0 | Status: SHIPPED | OUTPATIENT
Start: 2023-11-01 | End: 2023-11-08

## 2023-11-01 NOTE — TELEPHONE ENCOUNTER
Pt is having hard time peeing and burning and would like a script for UTI  on her 3rd day of pain from it please advise   Standley Rubinstein

## 2023-11-08 PROBLEM — N39.0 URINARY TRACT INFECTION: Status: RESOLVED | Noted: 2023-10-09 | Resolved: 2023-11-08
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
## 2023-11-27 RX ORDER — PANTOPRAZOLE SODIUM 40 MG/1
40 TABLET, DELAYED RELEASE ORAL DAILY
Qty: 90 TABLET | Refills: 0 | Status: SHIPPED | OUTPATIENT
Start: 2023-11-27

## 2023-11-27 NOTE — TELEPHONE ENCOUNTER
Yoel Degroot is calling to request a refill on the following medication(s):    Medication Request:  Requested Prescriptions     Pending Prescriptions Disp Refills    pantoprazole (PROTONIX) 40 MG tablet [Pharmacy Med Name: PANTOPRAZOLE SOD DR 40 MG TAB] 90 tablet 0     Sig: TAKE ONE TABLET BY MOUTH DAILY       Last Visit Date (If Applicable):  8/38/1757    Next Visit Date:    Visit date not found

## 2023-12-04 ENCOUNTER — TELEPHONE (OUTPATIENT)
Age: 68
End: 2023-12-04

## 2023-12-04 DIAGNOSIS — M62.81 GENERALIZED MUSCLE WEAKNESS: Primary | ICD-10-CM

## 2023-12-04 NOTE — TELEPHONE ENCOUNTER
would like to have [facility] for physical therapy  States wife is getting weak    [facility] fax # [phone] phone # [phone]    Please notify  when referral is sent

## 2023-12-07 ENCOUNTER — TELEPHONE (OUTPATIENT)
Age: 68
End: 2023-12-07

## 2023-12-07 NOTE — TELEPHONE ENCOUNTER
Delvin Garza called. [facility] but the last office notes from 9/12/23 are out of the date range to be used. She did not show up for a scheduled appointment on 10/10/23. Geo Freedman is going to call her  and have him call the office so Lito Hall can get scheduled so the referral can get started. Thank you.

## 2023-12-12 ENCOUNTER — TELEPHONE (OUTPATIENT)
Age: 68
End: 2023-12-12

## 2023-12-12 RX ORDER — DONEPEZIL HYDROCHLORIDE 5 MG/1
5 TABLET, FILM COATED ORAL NIGHTLY
Qty: 30 TABLET | Refills: 5 | Status: SHIPPED | OUTPATIENT
Start: 2023-12-12

## 2023-12-12 RX ORDER — CEPHALEXIN 500 MG/1
500 CAPSULE ORAL 3 TIMES DAILY
Qty: 30 CAPSULE | Refills: 0 | Status: SHIPPED | OUTPATIENT
Start: 2023-12-12 | End: 2024-01-25 | Stop reason: SDUPTHER

## 2023-12-12 NOTE — TELEPHONE ENCOUNTER
Carlos Hameed is calling to request a refill on the following medication(s):    Medication Request:  Requested Prescriptions     Pending Prescriptions Disp Refills    donepezil (ARICEPT) 5 MG tablet [Pharmacy Med Name: DONEPEZIL HCL 5 MG TABLET] 30 tablet      Sig: TAKE ONE TABLET BY MOUTH EVERY NIGHT AT BEDTIME       Last Visit Date (If Applicable):  2/68/1865    Next Visit Date:    12/14/2023 Detail Level: Simple Size Of Lesion In Cm (Optional): 0

## 2023-12-12 NOTE — TELEPHONE ENCOUNTER
Patient  called stating patient has had dysuria and urinary frequency for about a week. They are hoping you would call something in for her. [facility] in [place]

## 2023-12-14 ENCOUNTER — OFFICE VISIT (OUTPATIENT)
Age: 68
End: 2023-12-14
Payer: MEDICARE

## 2023-12-14 VITALS
OXYGEN SATURATION: 100 % | RESPIRATION RATE: 16 BRPM | HEART RATE: 90 BPM | HEIGHT: 66 IN | BODY MASS INDEX: 21.69 KG/M2 | DIASTOLIC BLOOD PRESSURE: 60 MMHG | TEMPERATURE: 98.2 F | WEIGHT: 135 LBS | SYSTOLIC BLOOD PRESSURE: 118 MMHG

## 2023-12-14 DIAGNOSIS — G20.C PRIMARY PARKINSONISM: ICD-10-CM

## 2023-12-14 DIAGNOSIS — R63.4 WEIGHT LOSS: ICD-10-CM

## 2023-12-14 DIAGNOSIS — G40.109 EPILEPSY, PARTIAL (HCC): Primary | ICD-10-CM

## 2023-12-14 DIAGNOSIS — G25.0 ESSENTIAL TREMOR: ICD-10-CM

## 2023-12-14 DIAGNOSIS — M62.81 GENERALIZED MUSCLE WEAKNESS: ICD-10-CM

## 2023-12-14 PROCEDURE — G8400 PT W/DXA NO RESULTS DOC: HCPCS | Performed by: FAMILY MEDICINE

## 2023-12-14 PROCEDURE — 3017F COLORECTAL CA SCREEN DOC REV: CPT | Performed by: FAMILY MEDICINE

## 2023-12-14 PROCEDURE — 1090F PRES/ABSN URINE INCON ASSESS: CPT | Performed by: FAMILY MEDICINE

## 2023-12-14 PROCEDURE — 1123F ACP DISCUSS/DSCN MKR DOCD: CPT | Performed by: FAMILY MEDICINE

## 2023-12-14 PROCEDURE — G8484 FLU IMMUNIZE NO ADMIN: HCPCS | Performed by: FAMILY MEDICINE

## 2023-12-14 PROCEDURE — 3074F SYST BP LT 130 MM HG: CPT | Performed by: FAMILY MEDICINE

## 2023-12-14 PROCEDURE — 99214 OFFICE O/P EST MOD 30 MIN: CPT | Performed by: FAMILY MEDICINE

## 2023-12-14 PROCEDURE — 3078F DIAST BP <80 MM HG: CPT | Performed by: FAMILY MEDICINE

## 2023-12-14 PROCEDURE — G8420 CALC BMI NORM PARAMETERS: HCPCS | Performed by: FAMILY MEDICINE

## 2023-12-14 PROCEDURE — 1036F TOBACCO NON-USER: CPT | Performed by: FAMILY MEDICINE

## 2023-12-14 PROCEDURE — G8427 DOCREV CUR MEDS BY ELIG CLIN: HCPCS | Performed by: FAMILY MEDICINE

## 2023-12-14 RX ORDER — MIRTAZAPINE 15 MG/1
15 TABLET, FILM COATED ORAL NIGHTLY
Qty: 90 TABLET | Refills: 1 | Status: SHIPPED | OUTPATIENT
Start: 2023-12-14

## 2023-12-14 NOTE — PROGRESS NOTES
[facility]     Date of Visit:  2023  Patient Name: Sheryle Mate   Patient :  1955     CHIEF COMPLAINT/HPI:     Sheryle Mate is a 77 y.o. female who presents today for an general visit to be evaluated for the following condition(s):  Chief Complaint   Patient presents with    Annual Exam     Patient is here today for an annual or maybe AWV, would like a  referral for Home Health and Hospice is coming out to see her maybe admitting her. Patient doing OK. Patient states having a meeting with hospice tomorrow. Seeing if she would qualify for palliative care or hospice. Would like to get home services resumed. Is wheelchair bound- can help stand and pivot but does not ambulate. Has been losing weight- appetite is poor. Voice is weak- difficulty swallowing. CONT f/u with neuro [facility] for Parkinsonism. REVIEW OF SYSTEM      Review of Systems   Respiratory:  Negative for chest tightness and shortness of breath. Cardiovascular:  Negative for chest pain.          REVIEWED INFORMATION      Allergies   Allergen Reactions    Bee Venom Anaphylaxis    Covid-19 (Mrna) Vaccine Other (See Comments)     seizures    Hydromorphone      Other reaction(s): Confusion/Hallucinations, Hallucinations  Other reaction(s): Unknown      Demerol Hcl [Meperidine]     Iodinated Contrast Media      \"CAT scan dye\"    Lorazepam     Nalbuphine Other (See Comments)     Other reaction(s): Passed out  Other reaction(s): light headed  Syncope      Nubain [Nalbuphine Hcl]      Syncope    Phenytoin     Topamax Diarrhea    Topiramate Diarrhea     Other reaction(s): Weight Loss    Vaccinia Immune Globulin Human     Zolpidem      Other reaction(s): Confusion    Iodine Rash    Lorazepam Anxiety     Other reaction(s): Agitation  Other reaction(s): agitated      Phenobarbital Rash    Sulfa Antibiotics Rash     Other reaction(s): Rash    Sulfonamide Derivatives Rash       Current Outpatient Medications   Medication

## 2023-12-29 ENCOUNTER — TELEPHONE (OUTPATIENT)
Age: 68
End: 2023-12-29

## 2023-12-29 NOTE — TELEPHONE ENCOUNTER
Alexandrea  from [facility]  called  and  FYI  patient  is  declining  and  refusing  Hospice  she  is  sleeping  all the  time  and  not  eating  or  drinking  if  any  questions  can  contract  Alexandrea  at  [phone]

## 2024-01-01 ENCOUNTER — TELEPHONE (OUTPATIENT)
Age: 69
End: 2024-01-01

## 2024-01-02 ENCOUNTER — TELEPHONE (OUTPATIENT)
Age: 69
End: 2024-01-02

## 2024-01-02 NOTE — TELEPHONE ENCOUNTER
Patient has two pressure ulcers on her behind that has now opened up. Alexandrea is wanting to know if you would like to order wound care orders for home?? Or have them go to a facility for wound care??    Please advise , Notify Alexandrea [phone]

## 2024-01-08 ENCOUNTER — TELEPHONE (OUTPATIENT)
Age: 69
End: 2024-01-08

## 2024-01-08 NOTE — TELEPHONE ENCOUNTER
Alexandrea from [facility] called to say that her BP today was 83/56.  It is outside there parameters.

## 2024-01-15 ENCOUNTER — TELEPHONE (OUTPATIENT)
Age: 69
End: 2024-01-15

## 2024-01-15 NOTE — TELEPHONE ENCOUNTER
Alexandrea from [facility] called to update us:  95/56--bp today   O2 is consistently down to 91%  Patient is complaining of burning with urination possible UTI for a couple days.   Patient has productive cough (green/yellow phlegm)   told Alexandrea there has been some difficulty with swallowing medication/pills recently.     Noel number:   [phone]

## 2024-01-16 RX ORDER — CEPHALEXIN 250 MG/5ML
500 POWDER, FOR SUSPENSION ORAL 3 TIMES DAILY
Qty: 300 ML | Refills: 0 | Status: SHIPPED | OUTPATIENT
Start: 2024-01-16 | End: 2024-01-26

## 2024-01-16 NOTE — TELEPHONE ENCOUNTER
Script for keflex liquid sent to pharmacy to help with possible UTI and upper respiratory- if patient is not improving with this next week then would want her to followup in office-

## 2024-01-24 ENCOUNTER — TELEPHONE (OUTPATIENT)
Age: 69
End: 2024-01-24

## 2024-01-24 NOTE — TELEPHONE ENCOUNTER
She was prescribed Kelflex but liquid and she is unable to take due to very bad taste. Asking for pill form instead.

## 2024-01-25 RX ORDER — CEPHALEXIN 500 MG/1
500 CAPSULE ORAL 3 TIMES DAILY
Qty: 30 CAPSULE | Refills: 0 | Status: SHIPPED | OUTPATIENT
Start: 2024-01-25

## 2024-02-08 ENCOUNTER — TELEPHONE (OUTPATIENT)
Age: 69
End: 2024-02-08

## 2024-02-08 DIAGNOSIS — G20.C PRIMARY PARKINSONISM: Primary | ICD-10-CM

## 2024-02-08 RX ORDER — ATORVASTATIN CALCIUM 10 MG/1
10 TABLET, FILM COATED ORAL DAILY
Qty: 90 TABLET | Refills: 3 | Status: SHIPPED | OUTPATIENT
Start: 2024-02-08

## 2024-02-08 NOTE — TELEPHONE ENCOUNTER
[facility]  hospice called need order for hospice care for pt she is ready for hospice care please advise we will fax order

## 2024-02-08 NOTE — TELEPHONE ENCOUNTER
Leanna Caputo is calling to request a refill on the following medication(s):    Medication Request:  Requested Prescriptions     Pending Prescriptions Disp Refills    atorvastatin (LIPITOR) 10 MG tablet [Pharmacy Med Name: ATORVASTATIN 10 MG TABLET] 90 tablet      Sig: TAKE ONE TABLET BY MOUTH DAILY       Last Visit Date (If Applicable):  12/14/2023    Next Visit Date:    2/9/2024

## 2024-02-09 ENCOUNTER — TELEPHONE (OUTPATIENT)
Age: 69
End: 2024-02-09

## 2024-02-09 NOTE — TELEPHONE ENCOUNTER
Alexandrea is calling to let you know that Leanna is having Hospice coming out on Monday and she is going out after to make sure and if so she will be discharged.

## 2024-02-12 ENCOUNTER — TELEPHONE (OUTPATIENT)
Age: 69
End: 2024-02-12

## 2024-02-12 NOTE — TELEPHONE ENCOUNTER
Spoke  to  Hospice  nurse  and  the  patient  advised  them  that you  was  covering  her  on the hospice  program   Yamile  [phone] Ask  for  Corrie Aguirre   they  will  be  faxing over  the  orders

## 2024-02-22 NOTE — TELEPHONE ENCOUNTER
Leanna Caputo is calling to request a refill on the following medication(s):    Medication Request:  Requested Prescriptions     Pending Prescriptions Disp Refills    pantoprazole (PROTONIX) 40 MG tablet [Pharmacy Med Name: PANTOPRAZOLE SOD DR 40 MG TAB] 90 tablet 0     Sig: TAKE 1 TABLET BY MOUTH DAILY       Last Visit Date (If Applicable):  12/14/2023    Next Visit Date:    2/28/2024

## 2024-02-23 RX ORDER — PANTOPRAZOLE SODIUM 40 MG/1
40 TABLET, DELAYED RELEASE ORAL DAILY
Qty: 90 TABLET | Refills: 0 | Status: SHIPPED | OUTPATIENT
Start: 2024-02-23

## 2024-03-01 NOTE — TELEPHONE ENCOUNTER
What orders? I see nothing in computer.  Generally hospice physician is the one ordering all meds etc-

## 2024-03-04 ENCOUNTER — OFFICE VISIT (OUTPATIENT)
Age: 69
End: 2024-03-04
Payer: MEDICARE

## 2024-03-04 VITALS
DIASTOLIC BLOOD PRESSURE: 78 MMHG | TEMPERATURE: 98.2 F | RESPIRATION RATE: 12 BRPM | HEART RATE: 97 BPM | SYSTOLIC BLOOD PRESSURE: 116 MMHG | HEIGHT: 66 IN | BODY MASS INDEX: 21.79 KG/M2 | OXYGEN SATURATION: 100 %

## 2024-03-04 DIAGNOSIS — G20.C PRIMARY PARKINSONISM: Primary | ICD-10-CM

## 2024-03-04 DIAGNOSIS — G40.109 EPILEPSY, PARTIAL (HCC): ICD-10-CM

## 2024-03-04 DIAGNOSIS — I48.0 PAROXYSMAL ATRIAL FIBRILLATION (HCC): ICD-10-CM

## 2024-03-04 DIAGNOSIS — G30.9 ALZHEIMER'S DISEASE, UNSPECIFIED (CODE) (HCC): ICD-10-CM

## 2024-03-04 PROBLEM — C43.9 MELANOMA (HCC): Status: RESOLVED | Noted: 2023-10-09 | Resolved: 2024-03-04

## 2024-03-04 PROBLEM — K50.90 REGIONAL ENTERITIS (HCC): Status: RESOLVED | Noted: 2023-10-09 | Resolved: 2024-03-04

## 2024-03-04 PROCEDURE — G8400 PT W/DXA NO RESULTS DOC: HCPCS | Performed by: FAMILY MEDICINE

## 2024-03-04 PROCEDURE — 1090F PRES/ABSN URINE INCON ASSESS: CPT | Performed by: FAMILY MEDICINE

## 2024-03-04 PROCEDURE — 3078F DIAST BP <80 MM HG: CPT | Performed by: FAMILY MEDICINE

## 2024-03-04 PROCEDURE — 1123F ACP DISCUSS/DSCN MKR DOCD: CPT | Performed by: FAMILY MEDICINE

## 2024-03-04 PROCEDURE — 3074F SYST BP LT 130 MM HG: CPT | Performed by: FAMILY MEDICINE

## 2024-03-04 PROCEDURE — 90694 VACC AIIV4 NO PRSRV 0.5ML IM: CPT | Performed by: FAMILY MEDICINE

## 2024-03-04 PROCEDURE — 1036F TOBACCO NON-USER: CPT | Performed by: FAMILY MEDICINE

## 2024-03-04 PROCEDURE — G8427 DOCREV CUR MEDS BY ELIG CLIN: HCPCS | Performed by: FAMILY MEDICINE

## 2024-03-04 PROCEDURE — G8484 FLU IMMUNIZE NO ADMIN: HCPCS | Performed by: FAMILY MEDICINE

## 2024-03-04 PROCEDURE — 99214 OFFICE O/P EST MOD 30 MIN: CPT | Performed by: FAMILY MEDICINE

## 2024-03-04 PROCEDURE — G0008 ADMIN INFLUENZA VIRUS VAC: HCPCS | Performed by: FAMILY MEDICINE

## 2024-03-04 PROCEDURE — G8420 CALC BMI NORM PARAMETERS: HCPCS | Performed by: FAMILY MEDICINE

## 2024-03-04 PROCEDURE — 3017F COLORECTAL CA SCREEN DOC REV: CPT | Performed by: FAMILY MEDICINE

## 2024-03-04 RX ORDER — HYOSCYAMINE SULFATE 0.125 MG
TABLET ORAL
COMMUNITY
Start: 2024-02-14

## 2024-03-04 RX ORDER — HALOPERIDOL 1 MG/1
TABLET ORAL
COMMUNITY
Start: 2024-02-14

## 2024-03-04 RX ORDER — BISACODYL 10 MG
SUPPOSITORY, RECTAL RECTAL
COMMUNITY
Start: 2024-02-14

## 2024-03-04 RX ORDER — DIAZEPAM 2 MG/1
TABLET ORAL
COMMUNITY
Start: 2024-02-22

## 2024-03-04 RX ORDER — TEMAZEPAM 30 MG/1
CAPSULE ORAL
COMMUNITY
Start: 2024-02-20

## 2024-03-04 RX ORDER — MORPHINE SULFATE 20 MG/ML
SOLUTION ORAL
COMMUNITY
Start: 2024-02-14

## 2024-03-04 NOTE — PROGRESS NOTES
After obtaining consent, and per orders of Dr. Matson, injection of Fluad given in Left deltoid by Arash Matute MA. Patient instructed to remain in clinic for 20 minutes afterwards, and to report any adverse reaction to me immediately.  
N/A 6/27/2018    EGD BIOPSY performed by Jack Faith MD at [facility] [place]        Social History     Socioeconomic History    Marital status:    Tobacco Use    Smoking status: Never    Smokeless tobacco: Never   Vaping Use    Vaping Use: Never used   Substance and Sexual Activity    Alcohol use: No     Social Determinants of Health     Financial Resource Strain: Low Risk  (9/12/2023)    Overall Financial Resource Strain (CARDIA)     Difficulty of Paying Living Expenses: Not hard at all   Transportation Needs: Unknown (9/12/2023)    PRAPARE - Transportation     Lack of Transportation (Non-Medical): No   Housing Stability: Unknown (9/12/2023)    Housing Stability Vital Sign     Unstable Housing in the Last Year: No        Family History   Problem Relation Age of Onset    Mult Sclerosis Other     Hypertension Mother     Migraines Mother     Heart Disease Father     Hypertension Father     Migraines Sister     Cancer Sister     Cancer Brother     Migraines Brother     Migraines Son         PHYSICAL EXAM     /78 (Site: Left Upper Arm, Position: Sitting)   Pulse 97   Temp 98.2 °F (36.8 °C)   Resp 12   Ht 1.676 m (5' 6\")   SpO2 100%   BMI 21.79 kg/m²    Physical Exam  Constitutional:       Appearance: Normal appearance.   HENT:      Head: Normocephalic and atraumatic.      Right Ear: Tympanic membrane normal.   Eyes:      Extraocular Movements: Extraocular movements intact.      Pupils: Pupils are equal, round, and reactive to light.   Cardiovascular:      Rate and Rhythm: Normal rate and regular rhythm.      Pulses: Normal pulses.   Pulmonary:      Breath sounds: Normal breath sounds.   Abdominal:      General: Bowel sounds are normal.      Palpations: Abdomen is soft.   Musculoskeletal:         General: Normal range of motion.   Neurological:      Mental Status: She is alert and oriented to person, place, and time.   Psychiatric:         Mood and Affect: Mood normal.     Wheelchair bound- extensive

## 2024-03-05 ASSESSMENT — ENCOUNTER SYMPTOMS
CHEST TIGHTNESS: 0
SHORTNESS OF BREATH: 0

## 2024-03-11 RX ORDER — TRAZODONE HYDROCHLORIDE 50 MG/1
TABLET ORAL
Qty: 30 TABLET | Refills: 5 | Status: SHIPPED | OUTPATIENT
Start: 2024-03-11

## 2024-03-11 NOTE — TELEPHONE ENCOUNTER
Leanna Caputo is calling to request a refill on the following medication(s):    Medication Request:  Requested Prescriptions     Pending Prescriptions Disp Refills    traZODone (DESYREL) 50 MG tablet [Pharmacy Med Name: traZODone 50 MG TABLET] 30 tablet 5     Sig: TAKE ONE TABLET BY MOUTH EVERY NIGHT AT BEDTIME AS NEEDED       Last Visit Date (If Applicable):  3/4/2024    Next Visit Date:    6/4/2024

## (undated) DEVICE — FORCEPS BX L240CM JAW DIA22MM ORNG STD CAP W NDL RAD JAW 4

## (undated) DEVICE — ESOPHAGEAL BALLOON DILATATION CATHETER: Brand: CRE FIXED WIRE

## (undated) DEVICE — SPHINCTEROTOME: Brand: DREAMTOME™ RX 44

## (undated) DEVICE — NEEDLE ASPIR 22GA NIT FN PRELD FNA EXCHG SYS BEAC

## (undated) DEVICE — FORCEPS BX L240CM WRK CHN 2.8MM STD CAP W/ NDL MIC MESH

## (undated) DEVICE — CATHETER PTCA L75CM L2CM OD5FR ODSEC5MM .035IN PERIPH 2

## (undated) DEVICE — RX DELIVERY SYSTEM: Brand: NAVIFLEX™ RX DELIVERY SYSTEM

## (undated) DEVICE — GUIDEWIRE ENDOSCP L260CM DIA0.035IN BILI STR RND TIP HI

## (undated) DEVICE — NEEDLE ASPIR 25GA NIT FN PRELD FNA EXCHG SYS BEAC

## (undated) DEVICE — ENDOSCOPIC ULTRASOUND ASPIRATION NEEDLE: Brand: EXPECT SLIMLINE SL

## (undated) DEVICE — RETRIEVAL BALLOON CATHETER: Brand: EXTRACTOR™ PRO RX